# Patient Record
Sex: FEMALE | Race: WHITE | NOT HISPANIC OR LATINO | Employment: UNEMPLOYED | URBAN - METROPOLITAN AREA
[De-identification: names, ages, dates, MRNs, and addresses within clinical notes are randomized per-mention and may not be internally consistent; named-entity substitution may affect disease eponyms.]

---

## 2023-05-11 ENCOUNTER — TELEPHONE (OUTPATIENT)
Dept: UROLOGY | Facility: AMBULATORY SURGERY CENTER | Age: 53
End: 2023-05-11

## 2023-05-11 RX ORDER — OXYCODONE HYDROCHLORIDE 5 MG/1
TABLET ORAL
COMMUNITY
Start: 2023-04-15

## 2023-05-11 RX ORDER — GABAPENTIN 800 MG/1
TABLET ORAL
COMMUNITY
Start: 2023-02-25

## 2023-05-11 RX ORDER — BUPRENORPHINE HYDROCHLORIDE 600 UG/1
600 FILM, SOLUBLE BUCCAL EVERY 12 HOURS
COMMUNITY
Start: 2023-04-20

## 2023-05-11 RX ORDER — PANTOPRAZOLE SODIUM 40 MG/1
40 TABLET, DELAYED RELEASE ORAL DAILY
COMMUNITY
Start: 2023-03-02

## 2023-05-11 RX ORDER — SUMATRIPTAN 100 MG/1
100 TABLET, FILM COATED ORAL
COMMUNITY

## 2023-05-11 RX ORDER — OXYCODONE HYDROCHLORIDE AND ACETAMINOPHEN 5; 325 MG/1; MG/1
TABLET ORAL
COMMUNITY
Start: 2023-03-08

## 2023-05-11 RX ORDER — ONDANSETRON 4 MG/1
4 TABLET, ORALLY DISINTEGRATING ORAL EVERY 8 HOURS PRN
COMMUNITY
Start: 2023-05-08

## 2023-05-11 RX ORDER — ACETAMINOPHEN AND CODEINE PHOSPHATE 300; 30 MG/1; MG/1
1 TABLET ORAL 2 TIMES DAILY PRN
COMMUNITY
Start: 2023-04-22

## 2023-05-11 RX ORDER — OLOPATADINE HYDROCHLORIDE 1 MG/ML
SOLUTION/ DROPS OPHTHALMIC
COMMUNITY
Start: 2023-04-29

## 2023-05-11 RX ORDER — ACETAMINOPHEN 500 MG
1000 TABLET ORAL
COMMUNITY
Start: 2023-04-15

## 2023-05-11 RX ORDER — VORTIOXETINE 20 MG/1
20 TABLET, FILM COATED ORAL
COMMUNITY
Start: 2023-03-09

## 2023-05-11 RX ORDER — HYDROXYZINE 50 MG/1
50-100 TABLET, FILM COATED ORAL
COMMUNITY
Start: 2023-03-09

## 2023-05-11 NOTE — TELEPHONE ENCOUNTER
New Patient    What is the reason for the patient’s appointment?: Kidney Stones     What office location does the patient prefer?: [place]     Have patient records been requested?:  If No, are the records showing in Epic: Patient getting records sent over       INSURANCE:  Do we accept the patient's insurance or is the patient Self-Pay?: Yes     Insurance Provider: [facility] ([employer])  Plan Type/Number: [id number]  Member ID#: [id number]     HISTORY:   Has the patient had any previous Urologist(s)?: Lisseth Hopkins     Was the patient seen in the ED?:   In December 2022 Kidney Stones     Has the patient had any outside testing done?: Yes getting records sent in     Does the patient have a personal history of cancer?: No

## 2023-05-15 ENCOUNTER — OFFICE VISIT (OUTPATIENT)
Dept: UROLOGY | Facility: CLINIC | Age: 53
End: 2023-05-15

## 2023-05-15 VITALS
SYSTOLIC BLOOD PRESSURE: 190 MMHG | TEMPERATURE: 98.4 F | BODY MASS INDEX: 30.61 KG/M2 | WEIGHT: 195 LBS | HEART RATE: 118 BPM | DIASTOLIC BLOOD PRESSURE: 120 MMHG | HEIGHT: 67 IN | OXYGEN SATURATION: 98 %

## 2023-05-15 DIAGNOSIS — N20.0 CALCULUS OF KIDNEY: Primary | ICD-10-CM

## 2023-05-15 NOTE — PROGRESS NOTES
UROLOGY PROGRESS NOTE         NAME: Anthony Hendrix  AGE: 46 y o  SEX: female  : 1970   MRN: [id number]    DATE: 5/15/2023  TIME: 4:02 PM    Assessment and Plan      Impression:   1  Calculus of kidney  -     XR abdomen 1 view kub; Future; Expected date: 08/15/2023    Patient is high blood pressure today  She also has some mild shortness of breath  She will seek immediate attention from her cardiologist or PCP  She has a small stone in her right kidney on recent CAT scan a week ago  No hydronephrosis or obstruction   Plan: She will seek immediate attention from her cardiologist or PCP regarding her blood pressure  She will follow-up in 3 months with a KUB  She will continue to hydrate well  Chief Complaint     Chief Complaint   Patient presents with   • Nephrolithiasis     Pain 10/10-not taking anything for pain    • leaking of urine      History of Present Illness     HPI: Anthony Hendrix is a 46y o  year old female who presents with history of kidney stones  She does have some right-sided pain abdominal pain she does not feel good  She states she is short of breath and just does not feel good  She has some right-sided flank pain abdominal pain  She does have a history of stones  Recent CAT scan which she brought the report and study reveals a small stone in her right kidney without any hydronephrosis  No stone in the ureter  She has had pain now for 2 weeks  Urinalysis is negative  She has a history of fibromyalgia and chronic pain  No fever and chills              The following portions of the patient's history were reviewed and updated as appropriate: allergies, current medications, past family history, past medical history, past social history, past surgical history and problem list   Past Medical History:   Diagnosis Date   • Abdominal wound dehiscence    • Benign neoplasm of left ear    • Chronic pain disorder     Back, Right hip, Left foot & left ankle   • Fibromyalgia    • "GERD (gastroesophageal reflux disease)    • H/O bariatric surgery 2019   • History of Mckenzie-en-Y gastric bypass 2018   • IBS (irritable bowel syndrome)    • Idiopathic sleep related nonobstructive alveolar hypoventilation 2017   • Kidney stone     w/ stent   • Left ankle pain    • Low back pain    • Mass of right side of neck 10/03/2018   • Migraine    • Morbid obesity with body mass index (BMI) of 40 0 to 44 9 in adult [facility])    • Nerve root pain 2016   • Obesity (BMI 30-39  9)    • Sepsis ([address] )     from kidney stone   • Venous reflux     Left leg     Past Surgical History:   Procedure Laterality Date   • APPENDECTOMY      laparoscopic   • BARIATRIC SURGERY      gastric bypass   •  SECTION     • CHOLECYSTECTOMY     • CYSTOSCOPY W/ URETERAL STENT PLACEMENT      & removal   • FEMUR FRACTURE SURGERY Left    • FOOT SURGERY     • GASTRIC BYPASS     • HAND TENDON SURGERY     • HERNIA REPAIR      x7   • HYSTERECTOMY      VARINDER   • INCONTINENCE SURGERY     • OOPHORECTOMY Left 2012   • ORTHOPEDIC SURGERY      thumbs   • PLANTAR FASCIA RELEASE     • TONSILLECTOMY     • TYMPANOPLASTY     • UPPER GASTROINTESTINAL ENDOSCOPY     • VEIN SURGERY      bypass in left leg around \"bad vein\" due to trauma to foot   • WRIST SURGERY Right      shoulder  Review of Systems     Const: Denies chills, fever and weight loss  CV: Denies chest pain  Resp: Denies SOB  GI: Denies abdominal pain, nausea and vomiting  : Denies symptoms other than stated above  Musculo: Denies back pain  Objective   BP (!) (P) 190/120 (BP Location: Left arm, Patient Position: Sitting, Cuff Size: Adult)   Pulse (!) (P) 118   Temp 98 4 °F (36 9 °C)   Wt 88 5 kg (195 lb)   SpO2 (P) 98%     Physical Exam  Const: Appears healthy and well developed  No signs of acute distress present  Resp: Respirations are regular and unlabored  CV: Rate is regular  Rhythm is regular    Abdomen: Abdomen is soft, nontender, " and nondistended  Kidneys are not palpable  : Mild right-sided tenderness and right lower quadrant tenderness  No left-sided tenderness  Psych: Patient's attitude is cooperative  Mood is normal  Affect is normal     Current Medications     Current Outpatient Medications:   •  acetaminophen (TYLENOL) 500 mg tablet, Take 1,000 mg by mouth, Disp: , Rfl:   •  gabapentin (NEURONTIN) 800 mg tablet, TAKE 1 TABLET (800 MG TOTAL) BY MOUTH FOUR TIMES A DAY , Disp: , Rfl:   •  hydrOXYzine HCL (ATARAX) 50 mg tablet, Take  mg by mouth daily at bedtime as needed, Disp: , Rfl:   •  olopatadine (PATANOL) 0 1 % ophthalmic solution, INSTILL 1 DROP INTO BOTH EYES TWICE A DAY, Disp: , Rfl:   •  ondansetron (ZOFRAN-ODT) 4 mg disintegrating tablet, Take 4 mg by mouth every 8 (eight) hours as needed, Disp: , Rfl:   •  SUMAtriptan (IMITREX) 100 mg tablet, Take 100 mg by mouth, Disp: , Rfl:   •  acetaminophen-codeine (TYLENOL #3) 300-30 mg per tablet, Take 1 tablet by mouth 2 (two) times a day as needed (Patient not taking: Reported on 5/15/2023), Disp: , Rfl:   •  Belbuca 600 MCG FILM, 600 mcg every 12 (twelve) hours (Patient not taking: Reported on 5/15/2023), Disp: , Rfl:   •  oxyCODONE (ROXICODONE) 5 immediate release tablet, TAKE 1 TABLET BY MOUTH EVERY 4 HOURS AS NEEDED FOR MODERATE PAIN   MAX DAILY AMOUNT: 6 TABS (Patient not taking: Reported on 5/15/2023), Disp: , Rfl:   •  oxyCODONE-acetaminophen (PERCOCET) 5-325 mg per tablet, TAKE 1 TABLET BY MOUTH EVERY 4 HOURS AS NEEDED FOR MODERATE PAIN (Patient not taking: Reported on 5/15/2023), Disp: , Rfl:   •  pantoprazole (PROTONIX) 40 mg tablet, Take 40 mg by mouth daily (Patient not taking: Reported on 5/15/2023), Disp: , Rfl:   •  Trintellix 20 MG tablet, Take 20 mg by mouth daily at bedtime (Patient not taking: Reported on 5/15/2023), Disp: , Rfl:         Howard Montgomery MD

## 2023-08-09 ENCOUNTER — TELEPHONE (OUTPATIENT)
Dept: UROLOGY | Facility: CLINIC | Age: 53
End: 2023-08-09

## 2023-08-10 NOTE — TELEPHONE ENCOUNTER
Call to pt, made her aware that there is an order in her chart for a KUB. I will mail it to her home now. Encouraged her to get the KUB at a [facility].

## 2023-08-11 RX ORDER — RIMEGEPANT SULFATE 75 MG/75MG
TABLET, ORALLY DISINTEGRATING ORAL
COMMUNITY
Start: 2023-05-30

## 2023-08-11 RX ORDER — ROPINIROLE 0.5 MG/1
TABLET, FILM COATED ORAL
COMMUNITY
Start: 2023-08-04

## 2023-08-15 ENCOUNTER — HOSPITAL ENCOUNTER (OUTPATIENT)
Dept: RADIOLOGY | Facility: HOSPITAL | Age: 53
Discharge: HOME | End: 2023-08-15
Payer: COMMERCIAL

## 2023-08-15 ENCOUNTER — OFFICE VISIT (OUTPATIENT)
Dept: UROLOGY | Facility: CLINIC | Age: 53
End: 2023-08-15
Payer: COMMERCIAL

## 2023-08-15 VITALS
OXYGEN SATURATION: 98 % | HEART RATE: 91 BPM | SYSTOLIC BLOOD PRESSURE: 152 MMHG | TEMPERATURE: 98.4 F | WEIGHT: 196 LBS | BODY MASS INDEX: 30.76 KG/M2 | DIASTOLIC BLOOD PRESSURE: 92 MMHG | HEIGHT: 67 IN

## 2023-08-15 DIAGNOSIS — N20.0 CALCULUS OF KIDNEY: ICD-10-CM

## 2023-08-15 DIAGNOSIS — N20.0 KIDNEY STONE: Primary | ICD-10-CM

## 2023-08-15 LAB
SL AMB  POCT GLUCOSE, UA: ABNORMAL
SL AMB LEUKOCYTE ESTERASE,UA: ABNORMAL
SL AMB POCT BILIRUBIN,UA: ABNORMAL
SL AMB POCT BLOOD,UA: ABNORMAL
SL AMB POCT CLARITY,UA: CLEAR
SL AMB POCT COLOR,UA: YELLOW
SL AMB POCT KETONES,UA: ABNORMAL
SL AMB POCT NITRITE,UA: ABNORMAL
SL AMB POCT PH,UA: 5.5
SL AMB POCT SPECIFIC GRAVITY,UA: 1.02
SL AMB POCT URINE PROTEIN: ABNORMAL
SL AMB POCT UROBILINOGEN: 0.2

## 2023-08-15 PROCEDURE — 81003 URINALYSIS AUTO W/O SCOPE: CPT | Performed by: UROLOGY

## 2023-08-15 PROCEDURE — 74018 RADEX ABDOMEN 1 VIEW: CPT

## 2023-08-15 PROCEDURE — 99213 OFFICE O/P EST LOW 20 MIN: CPT | Performed by: UROLOGY

## 2023-08-15 NOTE — PROGRESS NOTES
UROLOGY PROGRESS NOTE         NAME: Vern Cordova  AGE: 46 y.o. SEX: female  : 1970   MRN: [id number]    DATE: 8/15/2023  TIME: 1:56 PM    Assessment and Plan      Impression:   1. Kidney stone  -     POCT urine dip auto non-scope    History of right ureteral colic past month and a half. Urinary frequency and urgency as well. Feels like stones from past episodes.  Plan: Check CT scan. Strain urine. Follow-up soon      Chief Complaint   No chief complaint on file. History of Present Illness     HPI: Vern Cordova is a 46y.o. year old female who presents with history of stone disease septic episode many years ago. Currently with a 1-1/2-month history of right-sided flank pain radiates anteriorly to the right side. She has pain right lower quadrant as well. She has had some nausea. She has had no fever and chills. She has had significant urinary frequency and urgency. She has had some lightheaded feeling. No dysuria. UA negative today. KUB not helpful              The following portions of the patient's history were reviewed and updated as appropriate: allergies, current medications, past family history, past medical history, past social history, past surgical history and problem list.  Past Medical History:   Diagnosis Date   • Abdominal wound dehiscence    • Benign neoplasm of left ear    • Chronic pain disorder     Back, Right hip, Left foot & left ankle   • Fibromyalgia    • GERD (gastroesophageal reflux disease)    • H/O bariatric surgery 2019   • History of Mckeznie-en-Y gastric bypass 2018   • IBS (irritable bowel syndrome)    • Idiopathic sleep related nonobstructive alveolar hypoventilation 2017   • Kidney stone     w/ stent   • Left ankle pain    • Low back pain    • Mass of right side of neck 10/03/2018   • Migraine    • Morbid obesity with body mass index (BMI) of 40.0 to 44.9 in adult [facility])    • Nerve root pain 2016   • Obesity (BMI 30-39. 9)    • Sepsis ([address])     from kidney stone   • Venous reflux     Left leg     Past Surgical History:   Procedure Laterality Date   • APPENDECTOMY      laparoscopic   • BARIATRIC SURGERY  2018    gastric bypass   •  SECTION     • CHOLECYSTECTOMY     • CYSTOSCOPY W/ URETERAL STENT PLACEMENT      & removal   • FEMUR FRACTURE SURGERY Left    • FOOT SURGERY     • GASTRIC BYPASS     • HAND TENDON SURGERY     • HERNIA REPAIR      x7   • HYSTERECTOMY      VARINDER   • INCONTINENCE SURGERY     • OOPHORECTOMY Left 2012   • ORTHOPEDIC SURGERY      thumbs   • PLANTAR FASCIA RELEASE     • TONSILLECTOMY     • TYMPANOPLASTY     • UPPER GASTROINTESTINAL ENDOSCOPY     • VEIN SURGERY      bypass in left leg around "bad vein" due to trauma to foot   • WRIST SURGERY Right      shoulder  Review of Systems     Const: Denies chills, fever and weight loss. CV: Denies chest pain. Resp: Denies SOB. GI: Denies abdominal pain, nausea and vomiting. : Denies symptoms other than stated above. Musculo: Denies back pain. Objective   /92 (BP Location: Left arm, Patient Position: Sitting, Cuff Size: Standard)   Pulse 91   Temp 98.4 °F (36.9 °C)   Ht 5' 7" (1.702 m)   Wt 88.9 kg (196 lb)   SpO2 98%   BMI 30.70 kg/m²     Physical Exam  Const: Appears healthy and well developed. No signs of acute distress present. Resp: Respirations are regular and unlabored. CV: Rate is regular. Rhythm is regular. Abdomen: Abdomen is soft, nontender, and nondistended. Kidneys are not palpable. : Pain right lower quadrant right CVA tenderness. Psych: Patient's attitude is cooperative.  Mood is normal. Affect is normal.    Current Medications     Current Outpatient Medications:   •  gabapentin (NEURONTIN) 800 mg tablet, TAKE 1 TABLET (800 MG TOTAL) BY MOUTH FOUR TIMES A DAY., Disp: , Rfl:   •  olopatadine (PATANOL) 0.1 % ophthalmic solution, INSTILL 1 DROP INTO BOTH EYES TWICE A DAY, Disp: , Rfl:   •  ondansetron (ZOFRAN-ODT) 4 mg disintegrating tablet, Take 4 mg by mouth every 8 (eight) hours as needed, Disp: , Rfl:   •  rOPINIRole (REQUIP) 0.5 mg tablet, , Disp: , Rfl:   •  SUMAtriptan (IMITREX) 100 mg tablet, Take 100 mg by mouth, Disp: , Rfl:   •  acetaminophen (TYLENOL) 500 mg tablet, Take 1,000 mg by mouth (Patient not taking: Reported on 8/15/2023), Disp: , Rfl:   •  acetaminophen-codeine (TYLENOL #3) 300-30 mg per tablet, Take 1 tablet by mouth 2 (two) times a day as needed (Patient not taking: Reported on 5/15/2023), Disp: , Rfl:   •  Belbuca 600 MCG FILM, 600 mcg every 12 (twelve) hours (Patient not taking: Reported on 5/15/2023), Disp: , Rfl:   •  hydrOXYzine HCL (ATARAX) 50 mg tablet, Take  mg by mouth daily at bedtime as needed (Patient not taking: Reported on 8/15/2023), Disp: , Rfl:   •  oxyCODONE (ROXICODONE) 5 immediate release tablet, TAKE 1 TABLET BY MOUTH EVERY 4 HOURS AS NEEDED FOR MODERATE PAIN.  MAX DAILY AMOUNT: 6 TABS (Patient not taking: Reported on 5/15/2023), Disp: , Rfl:   •  oxyCODONE-acetaminophen (PERCOCET) 5-325 mg per tablet, TAKE 1 TABLET BY MOUTH EVERY 4 HOURS AS NEEDED FOR MODERATE PAIN (Patient not taking: Reported on 5/15/2023), Disp: , Rfl:   •  pantoprazole (PROTONIX) 40 mg tablet, Take 40 mg by mouth daily (Patient not taking: Reported on 5/15/2023), Disp: , Rfl:   •  rimegepant sulfate (Nurtec) 75 mg TBDP, Take every day (Patient not taking: Reported on 8/15/2023), Disp: , Rfl:   •  Trintellix 20 MG tablet, Take 20 mg by mouth daily at bedtime (Patient not taking: Reported on 5/15/2023), Disp: , Rfl:         Lary Crigler, MD

## 2023-08-17 ENCOUNTER — TELEPHONE (OUTPATIENT)
Dept: UROLOGY | Facility: CLINIC | Age: 53
End: 2023-08-17

## 2023-08-17 NOTE — TELEPHONE ENCOUNTER
Per chart review pt did not complete CT scan. Please have pt complete CT urgently per Dr. Rosendo Lang last office note.

## 2023-08-21 NOTE — TELEPHONE ENCOUNTER
Pt returned call. Pt stated that she is scheduled for CT but then stated that she needs to reschedule it.

## 2023-10-16 ENCOUNTER — TELEPHONE (OUTPATIENT)
Age: 53
End: 2023-10-16

## 2024-10-07 ENCOUNTER — TELEPHONE (OUTPATIENT)
Age: 54
End: 2024-10-07

## 2024-10-07 NOTE — TELEPHONE ENCOUNTER
Spoke with patient and tried to schedule her an appointment within 5-7 days to have a void trial due to having a catheter placed over the weekend for retention. [place] wanted calix out earlier. Made patient aware that she needs to keep calix in for 5-7 days. She want it out ASAP. Scheduled patient with next available appointment with Leah HASSAN on Thursday 10th at 1 pm. She verbalized understanding of appointment. Also relayed ER precautions due to patient stated that her body is trying to reject the calix catheter and she is unsure she can keep it in until her appointment. She said that she is going to call around and will let us know if she finds another appointment sooner to have it taken out sooner. Verbalized understanding to patient.

## 2024-10-07 NOTE — TELEPHONE ENCOUNTER
Pt called stated that she was at the [facility] and has cath in place and requested to follow up for cath removal.     Pt requested a call back ASAP    Please review

## 2024-10-08 RX ORDER — MECLIZINE HYDROCHLORIDE 25 MG/1
TABLET ORAL
COMMUNITY
Start: 2024-07-08

## 2024-10-08 RX ORDER — MIRABEGRON 25 MG/1
25 TABLET, FILM COATED, EXTENDED RELEASE ORAL
COMMUNITY
Start: 2024-07-12 | End: 2024-11-09

## 2024-10-08 RX ORDER — CIPROFLOXACIN 500 MG/1
TABLET, FILM COATED ORAL
COMMUNITY
Start: 2024-09-05

## 2024-10-08 RX ORDER — TRAMADOL HYDROCHLORIDE 50 MG/1
50 TABLET ORAL 2 TIMES DAILY PRN
COMMUNITY
Start: 2024-07-11

## 2024-10-09 PROBLEM — R33.9 URINARY RETENTION: Status: ACTIVE | Noted: 2024-10-09
